# Patient Record
Sex: FEMALE | Race: ASIAN | NOT HISPANIC OR LATINO | Employment: PART TIME | URBAN - METROPOLITAN AREA
[De-identification: names, ages, dates, MRNs, and addresses within clinical notes are randomized per-mention and may not be internally consistent; named-entity substitution may affect disease eponyms.]

---

## 2024-11-08 ENCOUNTER — PRE-ADMISSION TESTING (OUTPATIENT)
Dept: ADMISSIONS | Facility: MEDICAL CENTER | Age: 54
End: 2024-11-08
Attending: SPECIALIST
Payer: MEDICAID

## 2024-11-24 ENCOUNTER — ANESTHESIA EVENT (OUTPATIENT)
Dept: SURGERY | Facility: MEDICAL CENTER | Age: 54
End: 2024-11-24
Payer: MEDICAID

## 2024-11-25 ENCOUNTER — ANESTHESIA (OUTPATIENT)
Dept: SURGERY | Facility: MEDICAL CENTER | Age: 54
End: 2024-11-25
Payer: MEDICAID

## 2024-11-25 ENCOUNTER — HOSPITAL ENCOUNTER (OUTPATIENT)
Facility: MEDICAL CENTER | Age: 54
End: 2024-11-25
Attending: SPECIALIST | Admitting: SPECIALIST
Payer: MEDICAID

## 2024-11-25 VITALS
HEART RATE: 77 BPM | RESPIRATION RATE: 16 BRPM | DIASTOLIC BLOOD PRESSURE: 70 MMHG | HEIGHT: 64 IN | WEIGHT: 163.8 LBS | BODY MASS INDEX: 27.96 KG/M2 | TEMPERATURE: 97.9 F | SYSTOLIC BLOOD PRESSURE: 130 MMHG | OXYGEN SATURATION: 96 %

## 2024-11-25 PROBLEM — N88.8 CERVICAL MASS: Status: ACTIVE | Noted: 2024-11-25

## 2024-11-25 PROCEDURE — 160002 HCHG RECOVERY MINUTES (STAT): Performed by: SPECIALIST

## 2024-11-25 PROCEDURE — 700101 HCHG RX REV CODE 250: Mod: UD | Performed by: ANESTHESIOLOGY

## 2024-11-25 PROCEDURE — 160047 HCHG PACU  - EA ADDL 30 MINS PHASE II: Performed by: SPECIALIST

## 2024-11-25 PROCEDURE — 160048 HCHG OR STATISTICAL LEVEL 1-5: Performed by: SPECIALIST

## 2024-11-25 PROCEDURE — 160046 HCHG PACU - 1ST 60 MINS PHASE II: Performed by: SPECIALIST

## 2024-11-25 PROCEDURE — 700111 HCHG RX REV CODE 636 W/ 250 OVERRIDE (IP): Mod: UD | Performed by: ANESTHESIOLOGY

## 2024-11-25 PROCEDURE — A9270 NON-COVERED ITEM OR SERVICE: HCPCS | Mod: UD | Performed by: ANESTHESIOLOGY

## 2024-11-25 PROCEDURE — 700102 HCHG RX REV CODE 250 W/ 637 OVERRIDE(OP): Mod: UD | Performed by: ANESTHESIOLOGY

## 2024-11-25 PROCEDURE — 160009 HCHG ANES TIME/MIN: Performed by: SPECIALIST

## 2024-11-25 PROCEDURE — 160027 HCHG SURGERY MINUTES - 1ST 30 MINS LEVEL 2: Performed by: SPECIALIST

## 2024-11-25 PROCEDURE — 88305 TISSUE EXAM BY PATHOLOGIST: CPT

## 2024-11-25 PROCEDURE — 160038 HCHG SURGERY MINUTES - EA ADDL 1 MIN LEVEL 2: Performed by: SPECIALIST

## 2024-11-25 PROCEDURE — 160035 HCHG PACU - 1ST 60 MINS PHASE I: Performed by: SPECIALIST

## 2024-11-25 PROCEDURE — 700105 HCHG RX REV CODE 258: Mod: UD | Performed by: ANESTHESIOLOGY

## 2024-11-25 PROCEDURE — 160025 RECOVERY II MINUTES (STATS): Performed by: SPECIALIST

## 2024-11-25 RX ORDER — EPHEDRINE SULFATE 50 MG/ML
INJECTION, SOLUTION INTRAVENOUS PRN
Status: DISCONTINUED | OUTPATIENT
Start: 2024-11-25 | End: 2024-11-25 | Stop reason: SURG

## 2024-11-25 RX ORDER — MIDAZOLAM HYDROCHLORIDE 1 MG/ML
INJECTION INTRAMUSCULAR; INTRAVENOUS PRN
Status: DISCONTINUED | OUTPATIENT
Start: 2024-11-25 | End: 2024-11-25 | Stop reason: SURG

## 2024-11-25 RX ORDER — SODIUM CHLORIDE, SODIUM LACTATE, POTASSIUM CHLORIDE, CALCIUM CHLORIDE 600; 310; 30; 20 MG/100ML; MG/100ML; MG/100ML; MG/100ML
INJECTION, SOLUTION INTRAVENOUS
Status: DISCONTINUED | OUTPATIENT
Start: 2024-11-25 | End: 2024-11-25 | Stop reason: SURG

## 2024-11-25 RX ORDER — DEXAMETHASONE SODIUM PHOSPHATE 4 MG/ML
INJECTION, SOLUTION INTRA-ARTICULAR; INTRALESIONAL; INTRAMUSCULAR; INTRAVENOUS; SOFT TISSUE PRN
Status: DISCONTINUED | OUTPATIENT
Start: 2024-11-25 | End: 2024-11-25 | Stop reason: SURG

## 2024-11-25 RX ORDER — ONDANSETRON 2 MG/ML
INJECTION INTRAMUSCULAR; INTRAVENOUS PRN
Status: DISCONTINUED | OUTPATIENT
Start: 2024-11-25 | End: 2024-11-25 | Stop reason: SURG

## 2024-11-25 RX ORDER — HYDROMORPHONE HYDROCHLORIDE 1 MG/ML
0.1 INJECTION, SOLUTION INTRAMUSCULAR; INTRAVENOUS; SUBCUTANEOUS
Status: DISCONTINUED | OUTPATIENT
Start: 2024-11-25 | End: 2024-11-25 | Stop reason: HOSPADM

## 2024-11-25 RX ORDER — MIDAZOLAM HYDROCHLORIDE 1 MG/ML
1 INJECTION INTRAMUSCULAR; INTRAVENOUS
Status: DISCONTINUED | OUTPATIENT
Start: 2024-11-25 | End: 2024-11-25 | Stop reason: HOSPADM

## 2024-11-25 RX ORDER — LIDOCAINE HYDROCHLORIDE 20 MG/ML
INJECTION, SOLUTION EPIDURAL; INFILTRATION; INTRACAUDAL; PERINEURAL PRN
Status: DISCONTINUED | OUTPATIENT
Start: 2024-11-25 | End: 2024-11-25 | Stop reason: SURG

## 2024-11-25 RX ORDER — MEPERIDINE HYDROCHLORIDE 25 MG/ML
12.5 INJECTION INTRAMUSCULAR; INTRAVENOUS; SUBCUTANEOUS
Status: DISCONTINUED | OUTPATIENT
Start: 2024-11-25 | End: 2024-11-25 | Stop reason: HOSPADM

## 2024-11-25 RX ORDER — DIPHENHYDRAMINE HYDROCHLORIDE 50 MG/ML
12.5 INJECTION INTRAMUSCULAR; INTRAVENOUS
Status: DISCONTINUED | OUTPATIENT
Start: 2024-11-25 | End: 2024-11-25 | Stop reason: HOSPADM

## 2024-11-25 RX ORDER — EPHEDRINE SULFATE 50 MG/ML
5 INJECTION, SOLUTION INTRAVENOUS
Status: DISCONTINUED | OUTPATIENT
Start: 2024-11-25 | End: 2024-11-25 | Stop reason: HOSPADM

## 2024-11-25 RX ORDER — ACETAMINOPHEN 500 MG
500-1000 TABLET ORAL EVERY 6 HOURS PRN
COMMUNITY

## 2024-11-25 RX ORDER — SODIUM CHLORIDE, SODIUM LACTATE, POTASSIUM CHLORIDE, CALCIUM CHLORIDE 600; 310; 30; 20 MG/100ML; MG/100ML; MG/100ML; MG/100ML
INJECTION, SOLUTION INTRAVENOUS CONTINUOUS
Status: DISCONTINUED | OUTPATIENT
Start: 2024-11-25 | End: 2024-11-25 | Stop reason: HOSPADM

## 2024-11-25 RX ORDER — HYDROMORPHONE HYDROCHLORIDE 1 MG/ML
0.4 INJECTION, SOLUTION INTRAMUSCULAR; INTRAVENOUS; SUBCUTANEOUS
Status: DISCONTINUED | OUTPATIENT
Start: 2024-11-25 | End: 2024-11-25 | Stop reason: HOSPADM

## 2024-11-25 RX ORDER — CEFAZOLIN SODIUM 1 G/3ML
INJECTION, POWDER, FOR SOLUTION INTRAMUSCULAR; INTRAVENOUS PRN
Status: DISCONTINUED | OUTPATIENT
Start: 2024-11-25 | End: 2024-11-25 | Stop reason: SURG

## 2024-11-25 RX ORDER — SCOLOPAMINE TRANSDERMAL SYSTEM 1 MG/1
1 PATCH, EXTENDED RELEASE TRANSDERMAL
Status: DISCONTINUED | OUTPATIENT
Start: 2024-11-25 | End: 2024-11-25 | Stop reason: HOSPADM

## 2024-11-25 RX ORDER — ONDANSETRON 2 MG/ML
4 INJECTION INTRAMUSCULAR; INTRAVENOUS
Status: DISCONTINUED | OUTPATIENT
Start: 2024-11-25 | End: 2024-11-25 | Stop reason: HOSPADM

## 2024-11-25 RX ORDER — ACETAMINOPHEN 500 MG
1000 TABLET ORAL ONCE
Status: COMPLETED | OUTPATIENT
Start: 2024-11-25 | End: 2024-11-25

## 2024-11-25 RX ORDER — ALBUTEROL SULFATE 5 MG/ML
2.5 SOLUTION RESPIRATORY (INHALATION)
Status: DISCONTINUED | OUTPATIENT
Start: 2024-11-25 | End: 2024-11-25 | Stop reason: HOSPADM

## 2024-11-25 RX ORDER — HYDRALAZINE HYDROCHLORIDE 20 MG/ML
5 INJECTION INTRAMUSCULAR; INTRAVENOUS
Status: DISCONTINUED | OUTPATIENT
Start: 2024-11-25 | End: 2024-11-25 | Stop reason: HOSPADM

## 2024-11-25 RX ORDER — CELECOXIB 200 MG/1
200 CAPSULE ORAL ONCE
Status: COMPLETED | OUTPATIENT
Start: 2024-11-25 | End: 2024-11-25

## 2024-11-25 RX ORDER — HYDROMORPHONE HYDROCHLORIDE 1 MG/ML
0.2 INJECTION, SOLUTION INTRAMUSCULAR; INTRAVENOUS; SUBCUTANEOUS
Status: DISCONTINUED | OUTPATIENT
Start: 2024-11-25 | End: 2024-11-25 | Stop reason: HOSPADM

## 2024-11-25 RX ORDER — OXYCODONE HCL 5 MG/5 ML
5 SOLUTION, ORAL ORAL
Status: DISCONTINUED | OUTPATIENT
Start: 2024-11-25 | End: 2024-11-25 | Stop reason: HOSPADM

## 2024-11-25 RX ORDER — OXYCODONE HCL 5 MG/5 ML
10 SOLUTION, ORAL ORAL
Status: DISCONTINUED | OUTPATIENT
Start: 2024-11-25 | End: 2024-11-25 | Stop reason: HOSPADM

## 2024-11-25 RX ORDER — HALOPERIDOL 5 MG/ML
1 INJECTION INTRAMUSCULAR
Status: DISCONTINUED | OUTPATIENT
Start: 2024-11-25 | End: 2024-11-25 | Stop reason: HOSPADM

## 2024-11-25 RX ADMIN — ONDANSETRON 4 MG: 2 INJECTION INTRAMUSCULAR; INTRAVENOUS at 15:22

## 2024-11-25 RX ADMIN — LIDOCAINE HYDROCHLORIDE 100 MG: 20 INJECTION, SOLUTION EPIDURAL; INFILTRATION; INTRACAUDAL; PERINEURAL at 15:22

## 2024-11-25 RX ADMIN — CEFAZOLIN 2 G: 1 INJECTION, POWDER, FOR SOLUTION INTRAMUSCULAR; INTRAVENOUS at 15:18

## 2024-11-25 RX ADMIN — SCOPOLAMINE 1 PATCH: 1.5 PATCH, EXTENDED RELEASE TRANSDERMAL at 12:35

## 2024-11-25 RX ADMIN — SODIUM CHLORIDE, POTASSIUM CHLORIDE, SODIUM LACTATE AND CALCIUM CHLORIDE: 600; 310; 30; 20 INJECTION, SOLUTION INTRAVENOUS at 15:18

## 2024-11-25 RX ADMIN — MIDAZOLAM HYDROCHLORIDE 2 MG: 1 INJECTION, SOLUTION INTRAMUSCULAR; INTRAVENOUS at 15:22

## 2024-11-25 RX ADMIN — FENTANYL CITRATE 100 MCG: 50 INJECTION, SOLUTION INTRAMUSCULAR; INTRAVENOUS at 15:22

## 2024-11-25 RX ADMIN — EPHEDRINE SULFATE 10 MG: 50 INJECTION, SOLUTION INTRAVENOUS at 15:40

## 2024-11-25 RX ADMIN — CELECOXIB 200 MG: 200 CAPSULE ORAL at 12:35

## 2024-11-25 RX ADMIN — ACETAMINOPHEN 1000 MG: 500 TABLET ORAL at 12:35

## 2024-11-25 RX ADMIN — PROPOFOL 150 MG: 10 INJECTION, EMULSION INTRAVENOUS at 15:22

## 2024-11-25 RX ADMIN — DEXAMETHASONE SODIUM PHOSPHATE 8 MG: 4 INJECTION INTRA-ARTICULAR; INTRALESIONAL; INTRAMUSCULAR; INTRAVENOUS; SOFT TISSUE at 15:22

## 2024-11-25 ASSESSMENT — PAIN DESCRIPTION - PAIN TYPE
TYPE: SURGICAL PAIN

## 2024-11-25 ASSESSMENT — PAIN SCALES - GENERAL: PAIN_LEVEL: 0

## 2024-11-25 NOTE — ANESTHESIA PREPROCEDURE EVALUATION
Case: [id number] Date/Time: 11/25/24 1245    Procedure: REMOVAL/RESECTION OF CERVICAL MASS    Pre-op diagnosis: CERVICAL MASS, ABNORMAL CYTOLOGY FROM CERVIX    Location: [facility] ROOM 23 / SURGERY SAME DAY [facility]    Surgeons: Nirav Carrion M.D.            Relevant Problems   No relevant active problems       Physical Exam    Airway   Mallampati: II  TM distance: >3 FB  Neck ROM: full       Cardiovascular - normal exam  Rhythm: regular  Rate: normal  (-) murmur     Dental - normal exam           Pulmonary - normal exam  Breath sounds clear to auscultation     Abdominal    Neurological - normal exam                 Anesthesia Plan    ASA 2       Plan - general       Airway plan will be LMA          Induction: intravenous    Postoperative Plan: Postoperative administration of opioids is intended.    Pertinent diagnostic labs and testing reviewed    Informed Consent:    Anesthetic plan and risks discussed with patient.    Use of blood products discussed with: patient whom consented to blood products.       Translation via translation phone.

## 2024-11-25 NOTE — ANESTHESIA PROCEDURE NOTES
Airway    Date/Time: 11/25/2024 3:23 PM    Performed by: Fadi Art M.D.  Authorized by: Fadi Art M.D.    Location:  OR  Urgency:  Elective  Indications for Airway Management:  Anesthesia      Spontaneous Ventilation: absent    Sedation Level:  Deep  Preoxygenated: Yes    Mask Difficulty Assessment:  0 - not attempted  Final Airway Type:  Supraglottic airway  Final Supraglottic Airway:  Standard LMA    SGA Size:  3  Number of Attempts at Approach:  1

## 2024-11-25 NOTE — H&P
Jw Corona  YOB: 1970  Date of today's admission/procedure: Monday, November 25, 2024  Facility: [facility]    ID: The patient is a very pleasant 54-year-old primipara (para-1 with 1 previous vaginal delivery).    Chief complaint: The patient has no complaints. She has been found on exam to have wood appears to be a cervical mass possibly a cervical fibroid.    History of present illness:  The patient first came to my office on October 24, 2024 page she had been referred by [facility]. Her recent Pap smear had come back according to the records as atypical glandular cells of undetermined significance and review of the records revealed that the presence of a cervical mass had been mentioned. It is stated in these notes from this visit with me on October 24, 2024 that the patient told me that her last menstrual period had occurred in August of this year and it is stated in these notes that she had denied experiencing any hot flashes or night sweats and it is stated in these notes that her menstrual periods would normally occur about once per month. It is stated in these notes that speculum exam and colposcopy were performed and that the cervix was well visualized and in approximately 2 centimeter in diameter mass was seen extruding from the external cervical os and it is stated in these notes that this finding was consistent with either and endocervical polyp or a cervical fibroid. She is scheduled today to undergo resection of this cervical mass.    Past medical history:  The patient says she has no medical illnesses.    Past surgical history: The patient says she has had no previous surgeries.    Medications: The patient tells me that she takes no medications currently.    Allergies: The patient says she has no known drug allergies.    Social history: The patient denies smoking. She says she occasionally consumes  alcoholic beverages. She denies the use of recreational drugs.    Review of Systems:    General: The patient denies any fevers or chills or sweats.  Pulmonary: The patient denies any coughing or wheezing or chest pain or shortness of breath.  Cardiovascular: The patient denies any palpitations, chest pain, dyspnea.  Gastrointestinal: The patient denies any nausea, vomiting, diarrhea, constipation, hematochezia, melena.  Genitourinary: The patient denies any menorrhagia or dysmenorrhea or dysuria or hematuria.  Musculoskeletal: The patient denies any arthralgias or myalgias.  Neurological: The patient denies any headaches or syncope or seizures.      Physical Exam:    Vital signs: The patient's vital signs are stable and she is afebrile.  General: The patient appears well developed and well-nourished and relaxed and alert and comfortable and in no apparent distress.  HEENT: Normocephalic, atraumatic, pupils equal, round, reactive to light and accommodation, extra ocular motions intact, pharynx clear.  There is no thyromegaly.  There is no cervical lymphadenopathy.  Chest: Heart regular rate and rhythm, with no murmurs or rubs or gallops.  The lungs are clear to auscultation bilaterally.  Abdomen: Examination of the patient's abdomen today with the patient in the dorsal supine position reveals that the abdomen is soft and nontender and nondistended and that there is no evidence of hepatomegaly and that there is no evidence of splenomegaly.  There is no evidence detected on today's abdominal exam of any abdominal masses.  Extremities: No clubbing or cyanosis or edema.  Neurological: Nonfocal.        Assessment:   Cervical mass which could be polyp or could be a cervical fibroid.  Recent abnormal Pap smear (atypical glandular cells).    Plan:   We will proceed today with resection of cervical mass along with any and all other indicated procedures. I have discussed with the patient and explained to the patient in detail  and at length what cervical mass along with any and all other indicated procedures is and what cervical mass along with any and all other indicated procedures involves, and I have discussed with the patient explained to the patient in detail and at length the risks and benefits and alternatives of cervical mass along with any and all other indicated procedures. After our discussions and after answering her questions she told me that she very much wishes for us to proceed with cervical mass along with any and all other indicated procedures.        __________________  Nirav Carrion M.D.

## 2024-11-26 LAB — PATHOLOGY CONSULT NOTE: NORMAL

## 2024-11-26 NOTE — ANESTHESIA POSTPROCEDURE EVALUATION
Patient: Jw Corona    Procedure Summary       Date: 11/25/24 Room / Location: [facility] ROOM 23 / SURGERY SAME DAY [facility]    Anesthesia Start: 1518 Anesthesia Stop: 1604    Procedure: REMOVAL/RESECTION OF CERVICAL MASS Diagnosis: (CERVICAL MASS, ABNORMAL CYTOLOGY FROM CERVIX)    Surgeons: Nirav Carrion M.D. Responsible Provider: Fadi Art M.D.    Anesthesia Type: general ASA Status: 2            Final Anesthesia Type: general  Last vitals  BP   Blood Pressure: 118/73    Temp   36.6 °C (97.9 °F)    Pulse   97   Resp   18    SpO2   99 %      Anesthesia Post Evaluation    Patient location during evaluation: PACU  Patient participation: complete - patient participated  Level of consciousness: awake and alert  Pain score: 0    Airway patency: patent  Anesthetic complications: no  Cardiovascular status: hemodynamically stable  Respiratory status: acceptable  Hydration status: euvolemic    PONV: none        No notable events documented.     Nurse Pain Score: 0 (NPRS)

## 2024-11-26 NOTE — DISCHARGE INSTRUCTIONS
HOME CARE INSTRUCTIONS    ACTIVITY: Rest and take it easy for the first 24 hours.  A responsible adult is recommended to remain with you during that time.  It is normal to feel sleepy.  We encourage you to not do anything that requires balance, judgment or coordination.    FOR 24 HOURS DO NOT:  Drive, operate machinery or run household appliances.  Drink beer or alcoholic beverages.  Make important decisions or sign legal documents.    SPECIAL INSTRUCTIONS: See Handout.  Take Tylenol and Ibuprofen (over the counter medications) for pain, as needed, follow instructions on bottle.     DIET: To avoid nausea, slowly advance diet as tolerated, avoiding spicy or greasy foods for the first day.  Add more substantial food to your diet according to your physician's instructions.  Babies can be fed formula or breast milk as soon as they are hungry.  INCREASE FLUIDS AND FIBER TO AVOID CONSTIPATION.    SURGICAL DRESSING/BATHING: Ok to shower tomorrow, avoid submerging in water (hot tub, pool, bath) until ok with doctor    MEDICATIONS: Resume taking daily medication.  Take prescribed pain medication with food.  If no medication is prescribed, you may take non-aspirin pain medication if needed.  PAIN MEDICATION CAN BE VERY CONSTIPATING.  Take a stool softener or laxative such as senokot, pericolace, or milk of magnesia if needed.    Last pain medication given: Tylenol and Ibuprofen given at 12:35 pm, ok to take more after 6:35 pm, as needed for pain.     You have a scopolamine patch in place that is helpful for nausea/vertigo.  This can stay in place for 3 days.  If you experience uncomfortable side effects (headache, severe dry mouth) you may take the patch off sooner.  Wash hands thoroughly with soap and water if the patch is touched with a bare hand and when you remove the patch.      A follow-up appointment should be arranged with your doctor; call to schedule.    You should CALL YOUR PHYSICIAN if you develop:  Fever greater  than 101 degrees F.  Pain not relieved by medication, or persistent nausea or vomiting.  Excessive bleeding (blood soaking through dressing) or unexpected drainage from the wound.  Extreme redness or swelling around the incision site, drainage of pus or foul smelling drainage.  Inability to urinate or empty your bladder within 8 hours.  Problems with breathing or chest pain.    You should call 911 if you develop problems with breathing or chest pain.  If you are unable to contact your doctor or surgical center, you should go to the nearest emergency room or urgent care center.    Physician's telephone #: Dr. Carrion [phone]    MILD FLU-LIKE SYMPTOMS ARE NORMAL.  YOU MAY EXPERIENCE GENERALIZED MUSCLE ACHES, THROAT IRRITATION, HEADACHE AND/OR SOME NAUSEA.    If any questions arise, call your doctor.  If your doctor is not available, please feel free to call the Surgical Center at [phone].  The Center is open Monday through Friday from 7AM to 7PM.      A registered nurse may call you a few days after your surgery to see how you are doing after your procedure.    You may also receive a survey in the mail within the next two weeks and we ask that you take a few moments to complete the survey and return it to us.  Our goal is to provide you with very good care and we value your comments.

## 2024-11-26 NOTE — OP REPORT
DATE OF SERVICE:  11/25/2024     PREOPERATIVE DIAGNOSES:  1.  Cervical mass, which could be a polyp or could be a cervical fibroid.  2.  Recent abnormal Pap smear (atypical glandular cells).     POSTOPERATIVE DIAGNOSES:  1.  Cervical mass, which could be a polyp or could be a cervical fibroid.  2.  Recent abnormal Pap smear (atypical glandular cells).     PROCEDURES:  Removal/resection of cervical mass.     SURGEON:  Nirav Carrion MD     ANESTHESIA:  General anesthesia.     ANESTHESIOLOGIST:  Fadi Art MD     FINDINGS:  Speculum exam under anesthesia reveals a large (approximately 2 cm   in any dimension) polypoid excrescence emanating from the external cervical   os. Speculum exam reveals no other vulvar or vaginal or cervical lesions.     SPECIMENS:  Cervical mass.     COMPLICATIONS:  None.     ESTIMATED BLOOD LOSS:  Less than 20 mL.     DESCRIPTION OF PROCEDURE:  After appropriate consents have been obtained, the   patient was taken to the operating room and given general anesthesia.  She was   prepped and draped in the dorsal lithotomy position.  The bladder was emptied   with a catheter.  Speculum exam was performed and the cervix was well   visualized.  An examination of the cervix reveals no obvious approximately 2   cm in any dimension excrescence emanating from the external cervical os. This   excrescence or mass is grasped with single tooth tenaculum and Bovie   electrocautery is used to thoroughly cauterize the base/stalk and resected   this mass.  The mass was examined and found to be about 2 cm in any/all   dimensions.  This specimen was submitted.  Bleeding from the area of the   cervix where the mass had been excised is controlled with a Bovie   electrocautery.  Monsel solution is placed on this area as well to help ensure   continued hemostasis and pressure is placed with a scopette, covered with   Monsel solution for a few moments.  The scopette was removed from the cervix   and the  cervix was examined and no bleeding seen coming from the cervix.  The   speculum was then removed.  The procedure was terminated and the patient   tolerated the procedure well and sent to postanesthesia recovery in stable   condition.        ______________________________  MD KEELY Faustin/VAN    DD:  11/25/2024 16:16  DT:  11/25/2024 17:25    Job#:  [id number]

## 2024-11-26 NOTE — ANESTHESIA TIME REPORT
Anesthesia Start and Stop Event Times       Date Time Event    11/25/2024 1500 Ready for Procedure     1518 Anesthesia Start     1604 Anesthesia Stop          Responsible Staff  11/25/24      Name Role Begin End    Fadi Art M.D. Anesth 1518 1604          Overtime Reason:  overtime with call-back    Comments:

## 2024-11-26 NOTE — OR SURGEON
Immediate Post OP Note    PreOp Diagnosis:   Cervical mass which could be polyp or could be a cervical fibroid.  Recent abnormal Pap smear (atypical glandular cells).    PostOp Diagnosis:   Cervical mass which could be polyp or could be a cervical fibroid.  Recent abnormal Pap smear (atypical glandular cells).    Procedure(s):  REMOVAL/RESECTION OF CERVICAL MASS    Surgeon(s):  Nirav Carrion M.D.    Anesthesiologist/Type of Anesthesia:  Anesthesiologist: Fadi Art M.D./* No anesthesia type entered *    Surgical Staff:  Circulator: Mila Pope R.N.  Scrub Person: Sravanthi Diaz    Specimens removed if any:  ID Type Source Tests Collected by Time Destination   A : cervical mass Tissue Cervix PATHOLOGY SPECIMEN Nirav Carrion M.D. 11/25/2024  3:45 PM        Estimated Blood Loss:   Less than 20 cc's.    Findings:   Speculum exam under anesthesia reveals a large (approximately 2 cm in any dimension) polypoid excrescence emanating from the external cervical os.  Speculum exam reveals no other vulvar or vaginal or cervical lesions.    Complications:   None.        11/25/2024 4:07 PM Nirav Carrion M.D.

## 2024-11-26 NOTE — OR NURSING
1601 - Pt to PACU 10 from OR.  Bedside report from anesthesiologist and RN.  Attached to monitoring, VSS, breathing is calm and unlabored. Peripad clean, no bleeding noted, Oral airway in place, 8L mask. No signs pain or nausea.     1607 - Oral airway removed, pt remains on oxygen, no signs pain or nausea.     1715 - Utilized  pad to go over discharge instructions with patient and aunt bedside.  VSS, room air, ready to go home.  Dressed patient and IV removed.  She has minimal cramping, denies nausea, having water and crackers.     1740 - Pt stable to discharge. Taken via wheelchair to car.  Pt has all belongings with them.

## (undated) DEVICE — PAD SANITARY 11IN MAXI IND WRAPPED (12EA/PK 24PK/CA)

## (undated) DEVICE — SODIUM CHL IRRIGATION 0.9% 1000ML (12EA/CA)

## (undated) DEVICE — BOVIE BLADE COATED &INSULATED (50EA/PK)

## (undated) DEVICE — CANISTER SUCTION 3000ML MECHANICAL FILTER AUTO SHUTOFF MEDI-VAC NONSTERILE LF DISP (40EA/CA)

## (undated) DEVICE — TOWEL STOP TIMEOUT SAFETY FLAG (40EA/CA)

## (undated) DEVICE — LACTATED RINGERS INJ 1000 ML - (14EA/CA 60CA/PF)

## (undated) DEVICE — SET LEADWIRE 5 LEAD BEDSIDE DISPOSABLE ECG (1SET OF 5/EA)

## (undated) DEVICE — Device

## (undated) DEVICE — GOWN WARMING STANDARD FLEX - (30/CA)

## (undated) DEVICE — ELECTRODE DUAL RETURN W/ CORD - (50/PK)

## (undated) DEVICE — TUBE CONNECTING SUCTION - CLEAR PLASTIC STERILE 72 IN (50EA/CA)

## (undated) DEVICE — SLEEVE VASO DVT COMPRESSION CALF MED - (10PR/CA)

## (undated) DEVICE — GLOVE BIOGEL SZ 7.5 SURGICAL PF LTX - (50PR/BX 4BX/CA)

## (undated) DEVICE — SUTURE GENERAL

## (undated) DEVICE — TUBING CLEARLINK DUO-VENT - C-FLO (48EA/CA)

## (undated) DEVICE — WATER IRRIGATION STERILE 1000ML (12EA/CA)

## (undated) DEVICE — CANISTER SUCTION RIGID RED 1500CC (40EA/CA)

## (undated) DEVICE — KIT  I.V. START (100EA/CA)

## (undated) DEVICE — SUCTION INSTRUMENT YANKAUER BULBOUS TIP W/O VENT (50EA/CA)

## (undated) DEVICE — MASK OXYGEN VNYL ADLT MED CONC WITH 7 FOOT TUBING - (50EA/CA)

## (undated) DEVICE — SENSOR OXIMETER ADULT SPO2 RD SET (20EA/BX)

## (undated) DEVICE — DRESSING NON ADHERENT 3 X 4 - STERILE (100/BX 12BX/CA)

## (undated) DEVICE — CANNULA O2 COMFORT SOFT EAR ADULT 7 FT TUBING (50/CA)